# Patient Record
Sex: FEMALE | Race: WHITE | NOT HISPANIC OR LATINO | Employment: FULL TIME | URBAN - METROPOLITAN AREA
[De-identification: names, ages, dates, MRNs, and addresses within clinical notes are randomized per-mention and may not be internally consistent; named-entity substitution may affect disease eponyms.]

---

## 2024-01-23 ENCOUNTER — LAB (OUTPATIENT)
Dept: LAB | Facility: HOSPITAL | Age: 44
End: 2024-01-23
Payer: MEDICAID

## 2024-01-23 ENCOUNTER — OFFICE VISIT (OUTPATIENT)
Dept: FAMILY MEDICINE CLINIC | Facility: CLINIC | Age: 44
End: 2024-01-23
Payer: MEDICAID

## 2024-01-23 VITALS
OXYGEN SATURATION: 98 % | HEART RATE: 79 BPM | WEIGHT: 170 LBS | SYSTOLIC BLOOD PRESSURE: 126 MMHG | DIASTOLIC BLOOD PRESSURE: 80 MMHG | BODY MASS INDEX: 31.28 KG/M2 | HEIGHT: 62 IN

## 2024-01-23 DIAGNOSIS — F41.9 ANXIETY: ICD-10-CM

## 2024-01-23 DIAGNOSIS — F33.0 MAJOR DEPRESSIVE DISORDER, RECURRENT EPISODE, MILD DEGREE: ICD-10-CM

## 2024-01-23 DIAGNOSIS — Z76.89 ENCOUNTER TO ESTABLISH CARE: Primary | ICD-10-CM

## 2024-01-23 DIAGNOSIS — Z13.220 SCREENING FOR LIPID DISORDERS: ICD-10-CM

## 2024-01-23 LAB
ALBUMIN SERPL-MCNC: 4 G/DL (ref 3.5–5.2)
ALBUMIN/GLOB SERPL: 1.3 G/DL
ALP SERPL-CCNC: 64 U/L (ref 39–117)
ALT SERPL W P-5'-P-CCNC: 11 U/L (ref 1–33)
ANION GAP SERPL CALCULATED.3IONS-SCNC: 13.4 MMOL/L (ref 5–15)
AST SERPL-CCNC: 10 U/L (ref 1–32)
BILIRUB SERPL-MCNC: 0.2 MG/DL (ref 0–1.2)
BUN SERPL-MCNC: 10 MG/DL (ref 6–20)
BUN/CREAT SERPL: 10.3 (ref 7–25)
CALCIUM SPEC-SCNC: 9.6 MG/DL (ref 8.6–10.5)
CHLORIDE SERPL-SCNC: 101 MMOL/L (ref 98–107)
CHOLEST SERPL-MCNC: 249 MG/DL (ref 0–200)
CO2 SERPL-SCNC: 25.6 MMOL/L (ref 22–29)
CREAT SERPL-MCNC: 0.97 MG/DL (ref 0.57–1)
EGFRCR SERPLBLD CKD-EPI 2021: 74.5 ML/MIN/1.73
GLOBULIN UR ELPH-MCNC: 3.1 GM/DL
GLUCOSE SERPL-MCNC: 98 MG/DL (ref 65–99)
HBA1C MFR BLD: 5.8 % (ref 4.8–5.6)
HDLC SERPL QL: 3.36
HDLC SERPL-MCNC: 74 MG/DL (ref 40–60)
LDLC SERPL CALC-MCNC: 153 MG/DL (ref 0–100)
POTASSIUM SERPL-SCNC: 4.4 MMOL/L (ref 3.5–5.2)
PROT SERPL-MCNC: 7.1 G/DL (ref 6–8.5)
SODIUM SERPL-SCNC: 140 MMOL/L (ref 136–145)
TRIGL SERPL-MCNC: 125 MG/DL (ref 0–150)
TSH SERPL DL<=0.05 MIU/L-ACNC: 0.46 UIU/ML (ref 0.27–4.2)
VLDLC SERPL-MCNC: 22 MG/DL (ref 5–40)

## 2024-01-23 PROCEDURE — 83036 HEMOGLOBIN GLYCOSYLATED A1C: CPT | Performed by: STUDENT IN AN ORGANIZED HEALTH CARE EDUCATION/TRAINING PROGRAM

## 2024-01-23 PROCEDURE — 80053 COMPREHEN METABOLIC PANEL: CPT | Performed by: STUDENT IN AN ORGANIZED HEALTH CARE EDUCATION/TRAINING PROGRAM

## 2024-01-23 PROCEDURE — 80061 LIPID PANEL: CPT | Performed by: STUDENT IN AN ORGANIZED HEALTH CARE EDUCATION/TRAINING PROGRAM

## 2024-01-23 PROCEDURE — 84443 ASSAY THYROID STIM HORMONE: CPT | Performed by: STUDENT IN AN ORGANIZED HEALTH CARE EDUCATION/TRAINING PROGRAM

## 2024-01-23 RX ORDER — NORGESTIMATE AND ETHINYL ESTRADIOL 7DAYSX3 28
1 KIT ORAL DAILY
COMMUNITY
Start: 2024-01-03

## 2024-01-23 RX ORDER — BUPROPION HYDROCHLORIDE 150 MG/1
150 TABLET ORAL DAILY
Qty: 30 TABLET | Refills: 1 | Status: SHIPPED | OUTPATIENT
Start: 2024-01-23

## 2024-01-23 NOTE — PROGRESS NOTES
"    New Patient Office Visit      Date: 2024   Patient Name: Manisha Lopez  : 1980   MRN: [id number]     Chief Complaint:    Chief Complaint   Patient presents with    Establish Care     Blood pressure       History of Present Illness: Manisha Lopez is a 43 y.o. female who is here today to establish care.      Subjective      HPI:  Pt recently moved here from [place] to live with her significant other. Has a 12 and 15 year old.   Hasn't been to a PCP in sometime.   Has struggled on/off with anxiety and depressive episodes since she was 16.   On OCPs for the last few years and feels like these have caused significant mood changes as well.   Her mother passed away in the last couple of years and this has been really hard on her. The anniversary of her death recently passed and she struggled. Finds herself short tempered at times. Her partner is a really good support system for her and she states \"he calls me out when I'm being unreasonable.\"  She got temporary medicaid and immediately made an appt with a therapist which is scheduled in two weeks.   Interested in medication to help w her anxiety/depressive symptoms. Has been on Zoloft, Prozac, a few others in this class. Unfortunately had decreased libido/anorgasmia with all of them. Would like to try different medication.   She did briefly try Wellbutrin years ago for smoking cessation but didn't notice that it was particularly helpful for mood. Would be willing to try again.     Also notes some unwanted hair growth around the chin area. Has had this for years. Periods are regular. Has appt w Ob/Gyn next month. Partner recently had vasectomy so she is hopeful she can come off contraception altogether once he goes in for his checkup.     Since moving to [place] in  she has had about a 50 lbs weight loss. Diet has changed- rarely eats out, no fast food. She is more active as well but never thought she'd lose this much. Feels healthy. Wanted to make sure " "nothing else was going on to cause weight loss.     Review of Systems:   Negative/not pertinent unless otherwise noted above in HPI.     Past Medical History: No past medical history on file.    Past Surgical History: No past surgical history on file.    Family History: No family history on file.    Social History:   Social History     Socioeconomic History    Marital status: Single   Tobacco Use    Smoking status: Former     Types: Cigarettes    Smokeless tobacco: Former   Substance and Sexual Activity    Alcohol use: Not Currently    Drug use: Never    Sexual activity: Defer       Medications:     Current Outpatient Medications:     Tri-Estarylla 0.18/0.215/0.25 MG-35 MCG per tablet, Take 1 tablet by mouth Daily., Disp: , Rfl:     buPROPion XL (Wellbutrin XL) 150 MG 24 hr tablet, Take 1 tablet by mouth Daily., Disp: 30 tablet, Rfl: 1    Allergies:   No Known Allergies    Immunizations:    There is no immunization history on file for this patient.      Tobacco Use: Medium Risk (1/23/2024)    Patient History     Smoking Tobacco Use: Former     Smokeless Tobacco Use: Former     Passive Exposure: Not on file       Social History     Substance and Sexual Activity   Alcohol Use Not Currently        Social History     Substance and Sexual Activity   Drug Use Never        Diet/Physical activity: Healthy. counseled on 01/24/24      Sexual Health: using contraception, not attempting pregnancy   Menopause: pre-menopausal  Menstrual Cycles: regular, last menstrual cycle: unknown    PHQ-2 Depression Screening  Little interest or pleasure in doing things? 0-->not at all   Feeling down, depressed, or hopeless? 0-->not at all   PHQ-2 Total Score 0     PHQ-9 Total Score: 0       Objective     Physical Exam:  Vital Signs:   Vitals:    01/23/24 0922   BP: 126/80   BP Location: Left arm   Patient Position: Sitting   Cuff Size: Adult   Pulse: 79   SpO2: 98%   Weight: 77.1 kg (170 lb)   Height: 157.5 cm (62\")     Body mass index is " 31.09 kg/m².    Physical Exam  Constitutional:       Appearance: She is normal weight. She is not ill-appearing.   HENT:      Head: Normocephalic and atraumatic.      Right Ear: Tympanic membrane normal.      Left Ear: Tympanic membrane normal.      Mouth/Throat:      Mouth: Mucous membranes are moist.      Pharynx: Oropharynx is clear. No oropharyngeal exudate or posterior oropharyngeal erythema.   Eyes:      Extraocular Movements: Extraocular movements intact.      Conjunctiva/sclera: Conjunctivae normal.   Cardiovascular:      Rate and Rhythm: Normal rate and regular rhythm.      Heart sounds: Normal heart sounds.   Pulmonary:      Breath sounds: Normal breath sounds. No wheezing or rhonchi.   Abdominal:      General: Abdomen is flat.      Palpations: Abdomen is soft.      Tenderness: There is no abdominal tenderness.   Musculoskeletal:         General: Normal range of motion.      Cervical back: Normal range of motion and neck supple.   Lymphadenopathy:      Cervical: No cervical adenopathy.   Neurological:      General: No focal deficit present.      Mental Status: She is alert.   Psychiatric:         Mood and Affect: Mood normal.         Thought Content: Thought content normal.         Assessment / Plan      Assessment/Plan:   Diagnoses and all orders for this visit:    1. Encounter to establish care (Primary)  -     Comprehensive Metabolic Panel; Future  -     Lipid Panel With / Chol / HDL Ratio; Future  -     TSH Rfx On Abnormal To Free T4; Future  -     Hemoglobin A1c; Future  -     Comprehensive Metabolic Panel  -     Lipid Panel With / Chol / HDL Ratio  -     TSH Rfx On Abnormal To Free T4  -     Hemoglobin A1c    2. Major depressive disorder, recurrent episode, mild degree  -     buPROPion XL (Wellbutrin XL) 150 MG 24 hr tablet; Take 1 tablet by mouth Daily.  Dispense: 30 tablet; Refill: 1    3. Anxiety    4. Screening for lipid disorders  -     Lipid Panel With / Chol / HDL Ratio; Future  -     Lipid  Panel With / Chol / HDL Ratio      Depression/Anxiety  -Symptoms uncontrolled  -Given previous side effects with SSRI therapy, will trial Wellbutrin instead. Reviewed possible side effects including over stimulation, worsening anxiety  -Agree w counseling  -Check Phq-9, JOSE-7 at FU    Plan for physical/HM to be addressed at FU    Healthcare Maintenance:  BMI is >= 30 and <35. (Class 1 Obesity). The following options were offered after discussion;: nutrition counseling/recommendations      Manisha Lopez voices understanding and acceptance of this advice and will call back with any further questions or concerns. AVS with preventive healthcare tips printed for patient.         Follow Up:   Return in about 6 weeks (around 3/5/2024) for Annual/depression FU.    Jael Kendall DO  [facility] [place] [address]

## 2024-02-14 ENCOUNTER — TELEPHONE (OUTPATIENT)
Dept: FAMILY MEDICINE CLINIC | Facility: CLINIC | Age: 44
End: 2024-02-14
Payer: MEDICAID

## 2024-02-14 DIAGNOSIS — F41.9 ANXIETY: Primary | ICD-10-CM

## 2024-02-14 RX ORDER — BUSPIRONE HYDROCHLORIDE 10 MG/1
10 TABLET ORAL 2 TIMES DAILY
Qty: 60 TABLET | Refills: 0 | Status: SHIPPED | OUTPATIENT
Start: 2024-02-14

## 2024-03-04 ENCOUNTER — TELEPHONE (OUTPATIENT)
Dept: FAMILY MEDICINE CLINIC | Facility: CLINIC | Age: 44
End: 2024-03-04
Payer: MEDICAID

## 2024-03-04 DIAGNOSIS — F33.0 MAJOR DEPRESSIVE DISORDER, RECURRENT EPISODE, MILD DEGREE: ICD-10-CM

## 2024-03-04 DIAGNOSIS — F41.9 ANXIETY: ICD-10-CM

## 2024-03-04 RX ORDER — BUPROPION HYDROCHLORIDE 150 MG/1
150 TABLET ORAL DAILY
Qty: 30 TABLET | Refills: 2 | Status: SHIPPED | OUTPATIENT
Start: 2024-03-04

## 2024-03-04 RX ORDER — BUSPIRONE HYDROCHLORIDE 10 MG/1
10 TABLET ORAL 2 TIMES DAILY
Qty: 60 TABLET | Refills: 2 | Status: SHIPPED | OUTPATIENT
Start: 2024-03-04

## 2024-03-04 NOTE — TELEPHONE ENCOUNTER
Caller: Manisha Lopez    Relationship: Self    Best call back number: [phone]     What is the best time to reach you: ANY    Who are you requesting to speak with (clinical staff, provider,  specific staff member): JESSIE LO    Do you know the name of the person who called: SELF    What was the call regarding: PT WOULD LIKE RACHELLE TO CALL HER BACK.    Is it okay if the provider responds through MyChart: YES

## 2024-03-04 NOTE — TELEPHONE ENCOUNTER
Called pt. Lost insurance. Doing well on medications. Refills were sent until she can come back in for appt.

## 2024-03-25 DIAGNOSIS — F33.0 MAJOR DEPRESSIVE DISORDER, RECURRENT EPISODE, MILD DEGREE: ICD-10-CM

## 2024-03-25 RX ORDER — BUPROPION HYDROCHLORIDE 150 MG/1
150 TABLET ORAL DAILY
Qty: 90 TABLET | Refills: 0 | Status: SHIPPED | OUTPATIENT
Start: 2024-03-25

## 2024-04-18 ENCOUNTER — LAB (OUTPATIENT)
Dept: LAB | Facility: HOSPITAL | Age: 44
End: 2024-04-18
Payer: MEDICAID

## 2024-04-18 ENCOUNTER — OFFICE VISIT (OUTPATIENT)
Dept: FAMILY MEDICINE CLINIC | Facility: CLINIC | Age: 44
End: 2024-04-18
Payer: MEDICAID

## 2024-04-18 VITALS
OXYGEN SATURATION: 96 % | WEIGHT: 166.2 LBS | BODY MASS INDEX: 30.59 KG/M2 | HEIGHT: 62 IN | DIASTOLIC BLOOD PRESSURE: 74 MMHG | HEART RATE: 71 BPM | SYSTOLIC BLOOD PRESSURE: 132 MMHG

## 2024-04-18 DIAGNOSIS — Z30.011 ORAL CONTRACEPTION INITIAL PRESCRIPTION: ICD-10-CM

## 2024-04-18 DIAGNOSIS — N92.4 MENORRHAGIA, PREMENOPAUSAL: ICD-10-CM

## 2024-04-18 DIAGNOSIS — F41.9 ANXIETY: Primary | ICD-10-CM

## 2024-04-18 DIAGNOSIS — R45.86 MOOD SWINGS: ICD-10-CM

## 2024-04-18 DIAGNOSIS — F33.0 MAJOR DEPRESSIVE DISORDER, RECURRENT EPISODE, MILD DEGREE: ICD-10-CM

## 2024-04-18 PROCEDURE — 83002 ASSAY OF GONADOTROPIN (LH): CPT

## 2024-04-18 PROCEDURE — 36415 COLL VENOUS BLD VENIPUNCTURE: CPT

## 2024-04-18 PROCEDURE — 82670 ASSAY OF TOTAL ESTRADIOL: CPT

## 2024-04-18 PROCEDURE — 83001 ASSAY OF GONADOTROPIN (FSH): CPT

## 2024-04-18 PROCEDURE — 99214 OFFICE O/P EST MOD 30 MIN: CPT | Performed by: STUDENT IN AN ORGANIZED HEALTH CARE EDUCATION/TRAINING PROGRAM

## 2024-04-18 RX ORDER — NORGESTIMATE AND ETHINYL ESTRADIOL 0.25-0.035
1 KIT ORAL EVERY MORNING
COMMUNITY
Start: 2024-04-03 | End: 2024-04-18

## 2024-04-18 RX ORDER — NORETHINDRONE ACETATE AND ETHINYL ESTRADIOL 1MG-20(21)
1 KIT ORAL DAILY
Qty: 28 TABLET | Refills: 12 | Status: SHIPPED | OUTPATIENT
Start: 2024-04-18 | End: 2025-04-18

## 2024-04-18 NOTE — PROGRESS NOTES
Chief Complaint   Patient presents with    Major depressive disorder, recurrent episode, mild degree       HPI:  Manisha Lopez is a 43 y.o. female who presents today for FU anxiety/depression.     At last visit pt reported uncontrolled anxiety/depression symptoms. She was started on Wellbutrin and Buspar and reports this has worked really well for her. She does still have occasional days where she has mood swings, emotions feel up/down. She has tried to identify possible triggers and thinks it may be hormone related. She is on triphasic OCPs and feels like Tuesdays are typically worse. Will cry for no reason. Has this around time of her cycles too. She has never been on anything but triphasic OCPs. Periods are regular. Occasionally light and other times heavy bleeding.     PE:  Vitals:    04/18/24 1345   BP: 132/74   Pulse: 71   SpO2: 96%      Body mass index is 30.4 kg/m².    Gen Appearance: NAD  HEENT: Normocephalic, EOMI  Heart: RRR, normal S1 and S2, no murmur  Lungs: CTA b/l, no wheezing, no crackles  MSK: Moves all extremities well, normal gait, no peripheral edema  Neuro: No focal deficits    Current Outpatient Medications   Medication Sig Dispense Refill    buPROPion XL (Wellbutrin XL) 150 MG 24 hr tablet Take 1 tablet by mouth Daily. 90 tablet 0    busPIRone (BUSPAR) 10 MG tablet Take 1 tablet by mouth 2 (Two) Times a Day. 60 tablet 2    norethindrone-ethinyl estradiol FE (JUNEL FE 1/20) 1-20 MG-MCG per tablet Take 1 tablet by mouth Daily. 28 tablet 12     No current facility-administered medications for this visit.        A/P:  Diagnoses and all orders for this visit:    1. Anxiety (Primary)  2. Major depressive disorder, recurrent episode, mild degree  Moods significantly improved on current regimen of Buspar and Wellbutrin. Will continue. Planning to est with counselor soon.     3. Mood swings  4. Menorrhagia, premenopausal  Will check hormone labs per request. Given timing of her symptoms we discussed  option of switching from Triphasic to Monophasic OCPs and pt would like to try this. Rx sent.   -     Follicle stimulating hormone; Future  -     Luteinizing hormone; Future  -     Estradiol; Future    5. Oral contraception initial prescription  -     norethindrone-ethinyl estradiol FE (JUNEL FE 1/20) 1-20 MG-MCG per tablet; Take 1 tablet by mouth Daily.  Dispense: 28 tablet; Refill: 12         Return in about 6 weeks (around 5/30/2024) for Annual/pap 30 min.     Dictated Utilizing Dragon Dictation    Please note that portions of this note were completed with a voice recognition program.    Part of this note may be an electronic transcription/translation of spoken language to printed text using the Dragon Dictation System.

## 2024-04-19 PROBLEM — F41.9 ANXIETY: Status: ACTIVE | Noted: 2024-04-19

## 2024-04-19 PROBLEM — F33.0 MAJOR DEPRESSIVE DISORDER, RECURRENT EPISODE, MILD DEGREE: Status: ACTIVE | Noted: 2024-04-19

## 2024-04-19 LAB
ESTRADIOL SERPL HS-MCNC: 21.3 PG/ML
FSH SERPL-ACNC: 2.47 MIU/ML
LH SERPL-ACNC: 2.84 MIU/ML

## 2024-05-15 DIAGNOSIS — F41.9 ANXIETY: ICD-10-CM

## 2024-05-15 RX ORDER — BUSPIRONE HYDROCHLORIDE 10 MG/1
10 TABLET ORAL 2 TIMES DAILY
Qty: 60 TABLET | Refills: 2 | Status: SHIPPED | OUTPATIENT
Start: 2024-05-15

## 2024-05-15 NOTE — TELEPHONE ENCOUNTER
Rx Refill Note  Requested Prescriptions     Pending Prescriptions Disp Refills    busPIRone (BUSPAR) 10 MG tablet 60 tablet 2     Sig: Take 1 tablet by mouth 2 (Two) Times a Day.      Last office visit with prescribing clinician: 4/18/2024   Last telemedicine visit with prescribing clinician: Visit date not found   Next office visit with prescribing clinician: 6/3/2024       Catie Johnston MA  05/15/24, 14:29 EDT

## 2024-06-03 ENCOUNTER — OFFICE VISIT (OUTPATIENT)
Dept: FAMILY MEDICINE CLINIC | Facility: CLINIC | Age: 44
End: 2024-06-03
Payer: MEDICAID

## 2024-06-03 VITALS
OXYGEN SATURATION: 99 % | DIASTOLIC BLOOD PRESSURE: 84 MMHG | WEIGHT: 153 LBS | BODY MASS INDEX: 28.16 KG/M2 | SYSTOLIC BLOOD PRESSURE: 138 MMHG | HEART RATE: 80 BPM | HEIGHT: 62 IN

## 2024-06-03 DIAGNOSIS — F33.0 MAJOR DEPRESSIVE DISORDER, RECURRENT EPISODE, MILD DEGREE: ICD-10-CM

## 2024-06-03 DIAGNOSIS — N89.8 VAGINAL IRRITATION: ICD-10-CM

## 2024-06-03 DIAGNOSIS — Z23 NEED FOR PROPHYLACTIC VACCINATION AGAINST DIPHTHERIA AND TETANUS: ICD-10-CM

## 2024-06-03 DIAGNOSIS — A59.01 TRICHOMONAS VAGINITIS: ICD-10-CM

## 2024-06-03 DIAGNOSIS — Z00.00 ANNUAL PHYSICAL EXAM: Primary | ICD-10-CM

## 2024-06-03 DIAGNOSIS — Z12.31 ENCOUNTER FOR SCREENING MAMMOGRAM FOR MALIGNANT NEOPLASM OF BREAST: ICD-10-CM

## 2024-06-03 DIAGNOSIS — Z12.4 SCREENING FOR MALIGNANT NEOPLASM OF CERVIX: ICD-10-CM

## 2024-06-03 PROCEDURE — 1159F MED LIST DOCD IN RCRD: CPT | Performed by: STUDENT IN AN ORGANIZED HEALTH CARE EDUCATION/TRAINING PROGRAM

## 2024-06-03 PROCEDURE — 87798 DETECT AGENT NOS DNA AMP: CPT | Performed by: STUDENT IN AN ORGANIZED HEALTH CARE EDUCATION/TRAINING PROGRAM

## 2024-06-03 PROCEDURE — 87491 CHLMYD TRACH DNA AMP PROBE: CPT | Performed by: STUDENT IN AN ORGANIZED HEALTH CARE EDUCATION/TRAINING PROGRAM

## 2024-06-03 PROCEDURE — 87661 TRICHOMONAS VAGINALIS AMPLIF: CPT | Performed by: STUDENT IN AN ORGANIZED HEALTH CARE EDUCATION/TRAINING PROGRAM

## 2024-06-03 PROCEDURE — 87591 N.GONORRHOEAE DNA AMP PROB: CPT | Performed by: STUDENT IN AN ORGANIZED HEALTH CARE EDUCATION/TRAINING PROGRAM

## 2024-06-03 PROCEDURE — 90715 TDAP VACCINE 7 YRS/> IM: CPT | Performed by: STUDENT IN AN ORGANIZED HEALTH CARE EDUCATION/TRAINING PROGRAM

## 2024-06-03 PROCEDURE — 87801 DETECT AGNT MULT DNA AMPLI: CPT | Performed by: STUDENT IN AN ORGANIZED HEALTH CARE EDUCATION/TRAINING PROGRAM

## 2024-06-03 PROCEDURE — 1160F RVW MEDS BY RX/DR IN RCRD: CPT | Performed by: STUDENT IN AN ORGANIZED HEALTH CARE EDUCATION/TRAINING PROGRAM

## 2024-06-03 PROCEDURE — 90471 IMMUNIZATION ADMIN: CPT | Performed by: STUDENT IN AN ORGANIZED HEALTH CARE EDUCATION/TRAINING PROGRAM

## 2024-06-03 PROCEDURE — 99396 PREV VISIT EST AGE 40-64: CPT | Performed by: STUDENT IN AN ORGANIZED HEALTH CARE EDUCATION/TRAINING PROGRAM

## 2024-06-03 RX ORDER — BUPROPION HYDROCHLORIDE 300 MG/1
300 TABLET ORAL DAILY
Qty: 30 TABLET | Refills: 5 | Status: SHIPPED | OUTPATIENT
Start: 2024-06-03 | End: 2024-06-03 | Stop reason: SDUPTHER

## 2024-06-03 RX ORDER — BUPROPION HYDROCHLORIDE 300 MG/1
300 TABLET ORAL DAILY
Qty: 30 TABLET | Refills: 5 | Status: SHIPPED | OUTPATIENT
Start: 2024-06-03

## 2024-06-03 NOTE — PROGRESS NOTES
"    Female Physical Note      Date: 2024   Patient Name: Manisha Lopez  : 1980   MRN: [id number]     Chief Complaint:    Chief Complaint   Patient presents with    Annual Exam    Gynecologic Exam       History of Present Illness: Manisha Lopez is a 43 y.o. female who is here today for their annual health maintenance and physical.    HPI  Still having uncontrolled anxiety symptoms around the time of her menstrual cycle each month. She did not notice much difference in switching from OCP types. States last week was \"horrible.\" She would like to trial period off of OCPs but has been nervous to do so. Her significant other is s/p vasectomy.   FDLMP was 24. Gets some vaginal irritation just before her cycle starts and abnormal discharge. Agreeable to STI testing.   On certain days she will take two tablets of her Bupropion (total of 300mg). This does help with her mood. Would like to trial increase.  Due for mammogram.  Weight down again today to 153 lbs (was 166 in April). Continues to eat lower calorie intake and no longer drinking alcohol.       Subjective      Review of Systems:   Review of Systems   Constitutional:  Negative for activity change, appetite change, fever and unexpected weight loss.   Genitourinary:  Positive for vaginal discharge.   Psychiatric/Behavioral:  Positive for depressed mood. The patient is nervous/anxious.        Past Medical History, Social History, Family History and Care Team were all reviewed with patient and updated as appropriate.     Medications:     Current Outpatient Medications:     buPROPion XL (Wellbutrin XL) 300 MG 24 hr tablet, Take 1 tablet by mouth Daily., Disp: 30 tablet, Rfl: 5    busPIRone (BUSPAR) 10 MG tablet, Take 1 tablet by mouth 2 (Two) Times a Day., Disp: 60 tablet, Rfl: 2    norethindrone-ethinyl estradiol FE (JUNEL FE 1/20) 1-20 MG-MCG per tablet, Take 1 tablet by mouth Daily., Disp: 28 tablet, Rfl: 12    Allergies:   No Known " "Allergies    Immunizations:  Td/Tdap(Booster Q 10 yrs):  Today  Immunization History   Administered Date(s) Administered    COVID-19 (PFIZER) Purple Cap Monovalent 08/31/2021, 09/28/2021    Tdap 06/03/2024       Pap:  Today   Last Completed Pap Smear       This patient has no relevant Health Maintenance data.           Mammogram:  Ordered   Last Completed Mammogram       This patient has no relevant Health Maintenance data.             A1c: Reviewed  Lipid panel: Reviewed  The 10-year ASCVD risk score (Kylee RUIZ, et al., 2019) is: 0.7%    Values used to calculate the score:      Age: 43 years      Sex: Female      Is Non- : No      Diabetic: No      Tobacco smoker: No      Systolic Blood Pressure: 138 mmHg      Is BP treated: No      HDL Cholesterol: 74 mg/dL      Total Cholesterol: 249 mg/dL    Tobacco Use: Medium Risk (6/3/2024)    Patient History     Smoking Tobacco Use: Former     Smokeless Tobacco Use: Former     Passive Exposure: Not on file       Social History     Substance and Sexual Activity   Alcohol Use Not Currently        Social History     Substance and Sexual Activity   Drug Use Never        Diet/Physical activity: Healthy    Sexual Health: See HPI    PHQ-2 Depression Screening  Little interest or pleasure in doing things?     Feeling down, depressed, or hopeless?     PHQ-2 Total Score       PHQ-9 Total Score:   Reviewed in HPI      Objective     Physical Exam:  Vital Signs:   Vitals:    06/03/24 0915   BP: 138/84   BP Location: Left arm   Patient Position: Sitting   Cuff Size: Adult   Pulse: 80   SpO2: 99%   Weight: 69.4 kg (153 lb)   Height: 157.5 cm (62\")     Body mass index is 27.98 kg/m².     Physical Exam  Constitutional:       Appearance: She is normal weight. She is not ill-appearing.   HENT:      Head: Normocephalic and atraumatic.      Right Ear: Tympanic membrane normal.      Left Ear: Tympanic membrane normal.      Mouth/Throat:      Mouth: Mucous membranes are " moist.      Pharynx: Oropharynx is clear. No oropharyngeal exudate or posterior oropharyngeal erythema.   Eyes:      Extraocular Movements: Extraocular movements intact.      Conjunctiva/sclera: Conjunctivae normal.   Cardiovascular:      Rate and Rhythm: Normal rate and regular rhythm.      Heart sounds: Normal heart sounds.   Pulmonary:      Breath sounds: Normal breath sounds. No wheezing or rhonchi.   Abdominal:      General: Abdomen is flat.      Palpations: Abdomen is soft.      Tenderness: There is no abdominal tenderness.   Genitourinary:     Comments: Labia minora and majora without lesions or rash  Urethra normal, patent, without lesions or discharge  Introitus without lesions or evidence of trauma  Vagina with scant white mucoid discharge, no lesions  Cervix without visible lesions, min friable with Pap collection  Uterus midline without mass or tenderness  Adnexa without mass or tenderness bilaterally  Perineum intact without lesions  Anus without hemorrhoids or lesions    Breasts are normal and equal appearing  No palpable masses or lymphadenopathy noted  Normal appearing nipple, no discharge  Musculoskeletal:         General: Normal range of motion.      Cervical back: Normal range of motion and neck supple.   Lymphadenopathy:      Cervical: No cervical adenopathy.   Neurological:      General: No focal deficit present.      Mental Status: She is alert.   Psychiatric:         Mood and Affect: Mood normal.         Thought Content: Thought content normal.             Assessment / Plan      Assessment/Plan:   Diagnoses and all orders for this visit:    1. Annual physical exam (Primary)  Age appropriate screenings and recommendations reviewed  Cont healthy diet, regular physical activity  Mammo ordered  Pap today  Tdap today  -     Tdap Vaccine Greater Than or Equal To 8yo IM    2. Major depressive disorder, recurrent episode, mild degree  Suboptimally controlled  Increase wellbutrin to 300mg daily  Pt  would like to trial DC of OCPs to see if this helps w her mood changes around her cycle- will send update via My chart next month  Could consider low dose SSRI to help w mood in future  -     Discontinue: buPROPion XL (Wellbutrin XL) 300 MG 24 hr tablet; Take 1 tablet by mouth Daily.  Dispense: 30 tablet; Refill: 5  -     buPROPion XL (Wellbutrin XL) 300 MG 24 hr tablet; Take 1 tablet by mouth Daily.  Dispense: 30 tablet; Refill: 5    3. Encounter for screening mammogram for malignant neoplasm of breast  -     Mammo Screening Digital Tomosynthesis Bilateral With CAD; Future    4. Screening for malignant neoplasm of cervix  -     LIQUID-BASED PAP SMEAR WITH HPV GENOTYPING REGARDLESS OF INTERPRETATION (SASHA,COR,MAD); Future  -     LIQUID-BASED PAP SMEAR WITH HPV GENOTYPING REGARDLESS OF INTERPRETATION (SASHA,COR,MAD)    5. Vaginal irritation  -     NuSwab VG+ - Swab, Vagina; Future  -     NuSwab VG+ - Swab, Vagina    6. Need for prophylactic vaccination against diphtheria and tetanus  -     Tdap Vaccine Greater Than or Equal To 6yo IM         Healthcare Maintenance:  Counseling provided based on age appropriate USPSTF guidelines. Preventive counseling and anticipatory guidance discussed on the following topics: nutrition, physical activity, healthy weight, sexual behavior and STDs, contraception, mental health, immunizations, screenings, and self-breast exam         Manisha Lopez voices understanding and acceptance of this advice and will call back with any further questions or concerns. AVS with preventive healthcare tips printed for patient.         Follow Up:   Return in about 6 months (around 12/3/2024) for Anxiety/depression.          Jael Kendall DO  [facility] [place] [address]

## 2024-06-03 NOTE — LETTER
[facility]  Vaccine Consent Form    Patient Name:  Manisha Lopez  Patient :  1980     Vaccine(s) Ordered    Tdap Vaccine Greater Than or Equal To 6yo IM        Screening Checklist  The following questions should be completed prior to vaccination. If you answer “yes” to any question, it does not necessarily mean you should not be vaccinated. It just means we may need to clarify or ask more questions. If a question is unclear, please ask your healthcare provider to explain it.    Yes No   Any fever or moderate to severe illness today (mild illness and/or antibiotic treatment are not contraindications)?     Do you have a history of a serious reaction to any previous vaccinations, such as anaphylaxis, encephalopathy within 7 days, Guillain-Nunda syndrome within 6 weeks, seizure?     Have you received any live vaccine(s) (e.g MMR, KHARI) or any other vaccines in the last month (to ensure duplicate doses aren't given)?     Do you have an anaphylactic allergy to latex (DTaP, DTaP-IPV, Hep A, Hep B, MenB, RV, Td, Tdap), baker’s yeast (Hep B, HPV), polysorbates (RSV, nirsevimab, PCV 20, Rotavirrus, Tdap, Shingrix), or gelatin (KHARI, MMR)?     Do you have an anaphylactic allergy to neomycin (Rabies, KHARI, MMR, IPV, Hep A), polymyxin B (IPV), or streptomycin (IPV)?      Any cancer, leukemia, AIDS, or other immune system disorder? (KHARI, MMR, RV)     Do you have a parent, brother, or sister with an immune system problem (if immune competence of vaccine recipient clinically verified, can proceed)? (MMR, KHARI)     Any recent steroid treatments for >2 weeks, chemotherapy, or radiation treatment? (KHARI, MMR)     Have you received antibody-containing blood transfusions or IVIG in the past 11 months (recommended interval is dependent on product)? (MMR, KHARI)     Have you taken antiviral drugs (acyclovir, famciclovir, valacyclovir for KHARI) in the last 24 or 48 hours, respectively?      Are you pregnant or planning to become pregnant  "within 1 month? (KHARI, MMR, HPV, IPV, MenB, Abrexvy; For Hep B- refer to Engerix-B; For RSV - Abrysvo is indicated for 32-36 weeks of pregnancy from September to January)     For infants, have you ever been told your child has had intussusception or a medical emergency involving obstruction of the intestine (Rotavirus)? If not for an infant, can skip this question.         *Ordering Physicians/APC should be consulted if \"yes\" is checked by the patient or guardian above.  I have received, read, and understand the Vaccine Information Statement (VIS) for each vaccine ordered.  I have considered my or my child's health status as well as the health status of my close contacts.  I have taken the opportunity to discuss my vaccine questions with my or my child's health care provider.   I have requested that the ordered vaccine(s) be given to me or my child.  I understand the benefits and risks of the vaccines.  I understand that I should remain in the clinic for 15 minutes after receiving the vaccine(s).  _________________________________________________________  Signature of Patient or Parent/Legal Guardian ____________________  Date     "

## 2024-06-03 NOTE — PATIENT INSTRUCTIONS
Please remember to send me an updated on My Chart with how you're doing next month since we increased your Wellbutrin!  Happy early birthday!

## 2024-06-06 LAB
A VAGINAE DNA VAG QL NAA+PROBE: ABNORMAL SCORE
BVAB2 DNA VAG QL NAA+PROBE: ABNORMAL SCORE
C ALBICANS DNA VAG QL NAA+PROBE: NEGATIVE
C GLABRATA DNA VAG QL NAA+PROBE: NEGATIVE
C TRACH DNA VAG QL NAA+PROBE: NEGATIVE
MEGA1 DNA VAG QL NAA+PROBE: ABNORMAL SCORE
N GONORRHOEA DNA VAG QL NAA+PROBE: NEGATIVE
T VAGINALIS DNA VAG QL NAA+PROBE: POSITIVE

## 2024-06-06 RX ORDER — METRONIDAZOLE 500 MG/1
500 TABLET ORAL 2 TIMES DAILY
Qty: 14 TABLET | Refills: 0 | Status: SHIPPED | OUTPATIENT
Start: 2024-06-06 | End: 2024-06-13

## 2024-06-10 LAB — REF LAB TEST METHOD: NORMAL

## 2024-07-24 ENCOUNTER — HOSPITAL ENCOUNTER (OUTPATIENT)
Dept: MAMMOGRAPHY | Facility: HOSPITAL | Age: 44
Discharge: HOME | End: 2024-07-24
Admitting: STUDENT IN AN ORGANIZED HEALTH CARE EDUCATION/TRAINING PROGRAM
Payer: MEDICAID

## 2024-07-24 DIAGNOSIS — Z12.31 ENCOUNTER FOR SCREENING MAMMOGRAM FOR MALIGNANT NEOPLASM OF BREAST: ICD-10-CM

## 2024-07-24 LAB
NCCN CRITERIA FLAG: NORMAL
TYRER CUZICK SCORE: 10

## 2024-07-24 PROCEDURE — 77067 SCR MAMMO BI INCL CAD: CPT

## 2024-07-24 PROCEDURE — 77063 BREAST TOMOSYNTHESIS BI: CPT

## 2024-08-20 DIAGNOSIS — F41.9 ANXIETY: ICD-10-CM

## 2024-08-22 RX ORDER — BUSPIRONE HYDROCHLORIDE 10 MG/1
10 TABLET ORAL 2 TIMES DAILY
Qty: 180 TABLET | Refills: 0 | Status: SHIPPED | OUTPATIENT
Start: 2024-08-22

## 2024-11-17 DIAGNOSIS — F41.9 ANXIETY: ICD-10-CM

## 2024-11-18 RX ORDER — BUSPIRONE HYDROCHLORIDE 10 MG/1
10 TABLET ORAL 2 TIMES DAILY
Qty: 180 TABLET | Refills: 0 | Status: SHIPPED | OUTPATIENT
Start: 2024-11-18

## 2024-12-03 ENCOUNTER — OFFICE VISIT (OUTPATIENT)
Dept: FAMILY MEDICINE CLINIC | Facility: CLINIC | Age: 44
End: 2024-12-03
Payer: MEDICAID

## 2024-12-03 ENCOUNTER — LAB (OUTPATIENT)
Dept: LAB | Facility: HOSPITAL | Age: 44
End: 2024-12-03
Payer: MEDICAID

## 2024-12-03 VITALS
SYSTOLIC BLOOD PRESSURE: 136 MMHG | HEIGHT: 62 IN | OXYGEN SATURATION: 100 % | HEART RATE: 84 BPM | DIASTOLIC BLOOD PRESSURE: 92 MMHG | WEIGHT: 137 LBS | BODY MASS INDEX: 25.21 KG/M2

## 2024-12-03 DIAGNOSIS — R73.03 PREDIABETES: Primary | ICD-10-CM

## 2024-12-03 DIAGNOSIS — F41.9 ANXIETY: ICD-10-CM

## 2024-12-03 DIAGNOSIS — R63.4 UNINTENDED WEIGHT LOSS: ICD-10-CM

## 2024-12-03 DIAGNOSIS — R03.0 ELEVATED BLOOD PRESSURE READING: ICD-10-CM

## 2024-12-03 DIAGNOSIS — F33.0 MAJOR DEPRESSIVE DISORDER, RECURRENT EPISODE, MILD DEGREE: ICD-10-CM

## 2024-12-03 LAB
ALBUMIN SERPL-MCNC: 4.4 G/DL (ref 3.5–5.2)
ALBUMIN/GLOB SERPL: 1.5 G/DL
ALP SERPL-CCNC: 80 U/L (ref 39–117)
ALT SERPL W P-5'-P-CCNC: 8 U/L (ref 1–33)
ANION GAP SERPL CALCULATED.3IONS-SCNC: 11.9 MMOL/L (ref 5–15)
AST SERPL-CCNC: 13 U/L (ref 1–32)
BASOPHILS # BLD AUTO: 0.06 10*3/MM3 (ref 0–0.2)
BASOPHILS NFR BLD AUTO: 0.6 % (ref 0–1.5)
BILIRUB SERPL-MCNC: 0.4 MG/DL (ref 0–1.2)
BUN SERPL-MCNC: 8 MG/DL (ref 6–20)
BUN/CREAT SERPL: 8.2 (ref 7–25)
CALCIUM SPEC-SCNC: 10.1 MG/DL (ref 8.6–10.5)
CHLORIDE SERPL-SCNC: 104 MMOL/L (ref 98–107)
CO2 SERPL-SCNC: 25.1 MMOL/L (ref 22–29)
CREAT SERPL-MCNC: 0.98 MG/DL (ref 0.57–1)
DEPRECATED RDW RBC AUTO: 39.2 FL (ref 37–54)
EGFRCR SERPLBLD CKD-EPI 2021: 73.1 ML/MIN/1.73
EOSINOPHIL # BLD AUTO: 0.18 10*3/MM3 (ref 0–0.4)
EOSINOPHIL NFR BLD AUTO: 1.8 % (ref 0.3–6.2)
ERYTHROCYTE [DISTWIDTH] IN BLOOD BY AUTOMATED COUNT: 11.9 % (ref 12.3–15.4)
EXPIRATION DATE: NORMAL
GLOBULIN UR ELPH-MCNC: 3 GM/DL
GLUCOSE SERPL-MCNC: 98 MG/DL (ref 65–99)
HBA1C MFR BLD: 5.4 % (ref 4.5–5.7)
HCT VFR BLD AUTO: 46.3 % (ref 34–46.6)
HGB BLD-MCNC: 15 G/DL (ref 12–15.9)
IMM GRANULOCYTES # BLD AUTO: 0.02 10*3/MM3 (ref 0–0.05)
IMM GRANULOCYTES NFR BLD AUTO: 0.2 % (ref 0–0.5)
LYMPHOCYTES # BLD AUTO: 2.24 10*3/MM3 (ref 0.7–3.1)
LYMPHOCYTES NFR BLD AUTO: 22.2 % (ref 19.6–45.3)
Lab: NORMAL
MCH RBC QN AUTO: 29.5 PG (ref 26.6–33)
MCHC RBC AUTO-ENTMCNC: 32.4 G/DL (ref 31.5–35.7)
MCV RBC AUTO: 91 FL (ref 79–97)
MONOCYTES # BLD AUTO: 0.42 10*3/MM3 (ref 0.1–0.9)
MONOCYTES NFR BLD AUTO: 4.2 % (ref 5–12)
NEUTROPHILS NFR BLD AUTO: 7.19 10*3/MM3 (ref 1.7–7)
NEUTROPHILS NFR BLD AUTO: 71 % (ref 42.7–76)
NRBC BLD AUTO-RTO: 0 /100 WBC (ref 0–0.2)
PLATELET # BLD AUTO: 444 10*3/MM3 (ref 140–450)
PMV BLD AUTO: 9.4 FL (ref 6–12)
POTASSIUM SERPL-SCNC: 4 MMOL/L (ref 3.5–5.2)
PROT SERPL-MCNC: 7.4 G/DL (ref 6–8.5)
RBC # BLD AUTO: 5.09 10*6/MM3 (ref 3.77–5.28)
SODIUM SERPL-SCNC: 141 MMOL/L (ref 136–145)
TSH SERPL DL<=0.05 MIU/L-ACNC: 0.36 UIU/ML (ref 0.27–4.2)
WBC NRBC COR # BLD AUTO: 10.11 10*3/MM3 (ref 3.4–10.8)

## 2024-12-03 PROCEDURE — 80053 COMPREHEN METABOLIC PANEL: CPT

## 2024-12-03 PROCEDURE — 36415 COLL VENOUS BLD VENIPUNCTURE: CPT

## 2024-12-03 PROCEDURE — 99214 OFFICE O/P EST MOD 30 MIN: CPT | Performed by: STUDENT IN AN ORGANIZED HEALTH CARE EDUCATION/TRAINING PROGRAM

## 2024-12-03 PROCEDURE — 84443 ASSAY THYROID STIM HORMONE: CPT

## 2024-12-03 PROCEDURE — 83036 HEMOGLOBIN GLYCOSYLATED A1C: CPT | Performed by: STUDENT IN AN ORGANIZED HEALTH CARE EDUCATION/TRAINING PROGRAM

## 2024-12-03 PROCEDURE — 85025 COMPLETE CBC W/AUTO DIFF WBC: CPT

## 2024-12-03 PROCEDURE — 3044F HG A1C LEVEL LT 7.0%: CPT | Performed by: STUDENT IN AN ORGANIZED HEALTH CARE EDUCATION/TRAINING PROGRAM

## 2024-12-03 RX ORDER — BUPROPION HYDROCHLORIDE 150 MG/1
150 TABLET, FILM COATED, EXTENDED RELEASE ORAL 2 TIMES DAILY
Qty: 30 EACH | Refills: 0 | Status: SHIPPED | OUTPATIENT
Start: 2024-12-03

## 2024-12-03 RX ORDER — ESCITALOPRAM OXALATE 10 MG/1
TABLET ORAL
Qty: 30 TABLET | Refills: 1 | Status: SHIPPED | OUTPATIENT
Start: 2024-12-03

## 2024-12-03 NOTE — PATIENT INSTRUCTIONS
WELLBUTRIN TAPER:  -Start at 150mg twice daily for TWO WEEKS  -Then decrease to 1/2 tablet (75mg) twice daily for TWO WEEKS   -Then STOP    Lexapro TAPER  -Take 1/2 tablet (5mg) daily for ONE WEEK  -Then increase to full tablet (10mg) daily

## 2024-12-03 NOTE — PROGRESS NOTES
Chief Complaint   Patient presents with    Anxiety    Depression       HPI:  Manisha Lopez is a 44 y.o. female who presents today for anxiety/depression.     Mood symptoms have been worsening over the last few months.  Dealing with some family stressors.  She also had to have all of her teeth extracted last month and has not been able to eat because of the pain.  Has lost around 16 pounds since her last visit 6 months ago.  She is very self-conscious about her appearance with weight loss.  Med regimen at last appt was Wellbutrin (increased to 300mg) and Buspar 10mg BID. Mood has been worse. She had wanted to trial DC of her OCPs to see if this helped with mood regulation- mood noticeably worse around time of her menses.  Has successfully weaned off of these.  Has been on Prozac and Zoloft in the past. Prozac caused itching.  Zoloft was ineffective.    PE:  Vitals:    12/03/24 0819   BP: 136/92   Pulse: 84   SpO2: 100%      Body mass index is 25.06 kg/m².    Gen Appearance: NAD  HEENT: Normocephalic, PERRL, no thyromegaly, trachea midline  Heart: RRR, normal S1 and S2, no murmur  Lungs: CTA b/l, no wheezing, no crackles  MSK: Moves all extremities well, normal gait, no peripheral edema  Psych: Intermittently tearful, coherent thought processes  Neuro: No focal deficits    Current Outpatient Medications   Medication Sig Dispense Refill    busPIRone (BUSPAR) 10 MG tablet TAKE 1 TABLET BY MOUTH TWICE A  tablet 0    buPROPion (ZYBAN) 150 MG 12 hr tablet Take 150 mg by mouth 2 (Two) Times a Day. 30 each 0    escitalopram (Lexapro) 10 MG tablet Take 0.5 tablet daily for one week, then one tablet daily 30 tablet 1     No current facility-administered medications for this visit.        A/P:  Diagnoses and all orders for this visit:    1. Prediabetes (Primary)  A1c is now normal range  -     POC Glycosylated Hemoglobin (Hb A1C)    2. Anxiety  3. Major depressive disorder, recurrent episode, mild degree  Mood symptoms  are suboptimally controlled on current regimen.  Likely due to personal life stressors patient is very concerned about her appearance now w recent weight loss.  I am concerned that the increased bupropion dose is further contributing to lowered appetite.  Will reduce dose with plan of tapering off completely.  Discussed starting trial of SSRI therapy.  She has previously tried and failed Prozac and Zoloft.  Will start Lexapro.  Referral for counseling was placed.  May continue BuSpar in the meantime.  -     buPROPion (ZYBAN) 150 MG 12 hr tablet; Take 150 mg by mouth 2 (Two) Times a Day.  Dispense: 30 each; Refill: 0  -     escitalopram (Lexapro) 10 MG tablet; Take 0.5 tablet daily for one week, then one tablet daily  Dispense: 30 tablet; Refill: 1  -     Ambulatory Referral to Behavioral Health    4. Unintended weight loss  I suspect this is due to her recent full mouth extraction and inability to eat solid foods.  She just started increasing intake as of last week.  Will check labs as below to rule out secondary causes.  -     TSH Rfx On Abnormal To Free T4; Future  -     Comprehensive metabolic panel; Future    5. Elevated blood pressure reading  BP typically well-controlled however diastolic reading elevated in office today.  Could be stress related or side effect of bupropion.  Will wean off of the bupropion and recheck this at her follow-up appointment.  I advised her to monitor her ambulatory readings at home and follow DASH diet.  -     TSH Rfx On Abnormal To Free T4; Future  -     Comprehensive metabolic panel; Future  -     CBC & Differential; Future         Return in about 6 weeks (around 1/14/2025) for med/mood check.     Dictated Utilizing Dragon Dictation    Please note that portions of this note were completed with a voice recognition program.    Part of this note may be an electronic transcription/translation of spoken language to printed text using the Dragon Dictation System.

## 2024-12-15 ENCOUNTER — PATIENT MESSAGE (OUTPATIENT)
Dept: FAMILY MEDICINE CLINIC | Facility: CLINIC | Age: 44
End: 2024-12-15
Payer: MEDICAID

## 2024-12-15 DIAGNOSIS — F41.9 ANXIETY: Primary | ICD-10-CM

## 2024-12-15 DIAGNOSIS — F33.0 MAJOR DEPRESSIVE DISORDER, RECURRENT EPISODE, MILD DEGREE: ICD-10-CM

## 2024-12-16 RX ORDER — VENLAFAXINE HYDROCHLORIDE 37.5 MG/1
37.5 CAPSULE, EXTENDED RELEASE ORAL DAILY
Qty: 30 CAPSULE | Refills: 1 | Status: SHIPPED | OUTPATIENT
Start: 2024-12-16

## 2024-12-18 ENCOUNTER — OFFICE VISIT (OUTPATIENT)
Age: 44
End: 2024-12-18
Payer: MEDICAID

## 2024-12-18 DIAGNOSIS — F43.10 POST TRAUMATIC STRESS DISORDER (PTSD): Primary | ICD-10-CM
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
## 2024-12-18 NOTE — PROGRESS NOTES
Initial Assessment Note   Date: 12/18/2024   Client Name: Manisha Lopez  MRN: [id number]  Start Time: ***  End Time: ***     Diagnoses and all orders for this visit:    1. JOSE (generalized anxiety disorder) (Primary)    2. MDD (major depressive disorder), recurrent episode, mild         Active Symptoms:   ***     Reported History     Hx of Presenting problem:   Client reported seeking services today due to ***        Trauma Assessment:   Client reported a HX of trauma, which includes ***     Symptoms associated with experienced trauma:     {RW Trauma Symptoms:71268}    Summary:   Client reported the above symptoms ***     Clinician will utilize and trauma focused modality to aid the Client in their treatment.           Family HX of Mental Health Conditions:   Client reported ***     Previous Treatment HX/MH Hospitalizations:   Client reported ***        PHQ-9  The PHQ has not been completed during this encounter.        JOSE-7            Mental Status Exam  Appearance:  clean and casually dressed, appropriate  Attitude toward clinician:  cooperative and agreeable   Speech:    Rate:  regular rate and rhythm   Volume:  normal  Affect:  anxious  Thought Processes:  linear, logical, and goal directed  Thought Content:  normal  Suicidal Thoughts:  absent  Homicidal Thoughts:  absent  Perceptual Disturbance: no perceptual disturbance  Attention and Concentration:  good  Insight and Judgement:  good  Memory:  memory appears to be intact       Support System and Protective Factors     Client reported having the following support system:   ***     Client described their interactions with their support system as ***     Does Client have a responsibility to care for children or pets at this time?   ***     Level of Client's current coping skills:   Client has *** Coping Skills.    Does Client have plans for the future that extend beyond one week?   ***     Can Client provide a reason for living?   ***     Does Client  feel like their life has a purpose?   ***     Does Client feel safe in their living environment?     Client reported they feel ***        Short Term goal for treatment:   “***.”     Long Term goal for treatment:   “***.”     Services Client is Seeking:  { Services Seekin}     [place] Suicide Severity Rating (C-SSRS)  Assessment was completed and is in the chart    C.A.G.E.  C: Have you ever felt like he needed to cut down on your drinking or drug use?  {YES OR NO:46874}   A: Have people annoyed you by criticizing your drinking or drug use?  {YES OR NO:82020}   G: Have you ever felt bad or guilty about your drinking or drug use? {YES OR NO:72467}   E: Have you ever had a drink first thing in the morning to steady her nerves or to get rid of a hangover? {YES OR NO:00820}       Risk of Harm to Self:   { Low - High:00262}    Client reported ***     Does Client currently have or has ever had a HX of HI/Desire to inflict serious injury onto another/Physically Aggressive BX/Verbally aggressive BX?   ***     Risk of Harm to Others:   { Low - High:86719}    Client reported ***        Initial Session Narrative     Clinical Formulation  Client reported seeking services today due to ***    Client *** to having a HX of SI, HI, substance misuse, aggressive physical behaviors, or psychiatric hospitalizations.     Client reported they live ***     Client's current coping skills consist of ***    Per Client's reports, Client meets the criteria for ***    ?   Initial intervention:   Clinician met with Client in person at [facility].     Clinician completed initial assessment, [place] Suicide Related Assessment, safety plan, CAGE addiction assessment, PHQ-9, JOSE 7, trauma assessment, and explored the Client's protective factors and strengthens.     Clinician explained permission to treat, confidentiality, the limitations of confidentiality, and discussed NO SHOW policy.     Clinician utilized the MI  technique of active listening and open ended questions, to gather information, provide validation, assess barriers/readiness for change, and build rapport.     Clinician provided the Client with information on DX and possible treatment methods i.e., CBT/DBT/SFT/EMDR.    Clinician provided psychoeducation to the client on the use of the EMDR and the client agreed to the use of EMDR in session.    Client was receptive throughout the session and agreed to work with this Clinician to obtain their treatment goals.     Clinician plans to complete any outstanding assessments needed for treatment and complete the Client's Care/Treatment Plan with the client during the next session.      Employment: {Cancer Employment:54173}    Education: As the client currently enrolled or attending an education or vocation program?{Yes or No:64482}    Arrests in past 30 days? ***    Number of self-help groups attended in past 30 days?  ***    Ford Hendricks LCSW  [facility]   no

## 2024-12-18 NOTE — PATIENT INSTRUCTIONS
"Client will begin to use a DBT technique of AAA awareness piece, by practicing the use of identifying the 3 elements of the cognitive triangle for discussion in the next session.      Follow-up:     Client agrees to keep all follow-up appointments with [facility].       Resources:     Contact Office at [phone], [phone], Text \"HOME\" to [phone], and/or go to the nearest Hospital/ER.   "

## 2025-03-04 DIAGNOSIS — F41.9 ANXIETY: ICD-10-CM

## 2025-03-04 DIAGNOSIS — F33.0 MAJOR DEPRESSIVE DISORDER, RECURRENT EPISODE, MILD DEGREE: ICD-10-CM

## 2025-03-04 RX ORDER — VENLAFAXINE HYDROCHLORIDE 37.5 MG/1
37.5 CAPSULE, EXTENDED RELEASE ORAL DAILY
Qty: 30 CAPSULE | Refills: 1 | Status: SHIPPED | OUTPATIENT
Start: 2025-03-04

## 2025-03-04 NOTE — TELEPHONE ENCOUNTER
Rx Refill Note  Requested Prescriptions      No prescriptions requested or ordered in this encounter      Last office visit with prescribing clinician: 12/3/2024   Last telemedicine visit with prescribing clinician: Visit date not found   Next office visit with prescribing clinician: Visit date not found       Catie Johnston MA  03/04/25, 14:04 EST

## 2025-03-13 DIAGNOSIS — F33.0 MAJOR DEPRESSIVE DISORDER, RECURRENT EPISODE, MILD DEGREE: ICD-10-CM

## 2025-03-13 DIAGNOSIS — F41.9 ANXIETY: ICD-10-CM

## 2025-03-13 RX ORDER — VENLAFAXINE HYDROCHLORIDE 37.5 MG/1
37.5 CAPSULE, EXTENDED RELEASE ORAL DAILY
Qty: 30 CAPSULE | Refills: 2 | Status: SHIPPED | OUTPATIENT
Start: 2025-03-13

## 2025-04-11 DIAGNOSIS — F33.0 MAJOR DEPRESSIVE DISORDER, RECURRENT EPISODE, MILD DEGREE: ICD-10-CM

## 2025-04-11 RX ORDER — BUPROPION HYDROCHLORIDE 150 MG/1
150 TABLET, FILM COATED, EXTENDED RELEASE ORAL 2 TIMES DAILY
Qty: 30 TABLET | Refills: 1 | Status: SHIPPED | OUTPATIENT
Start: 2025-04-11

## 2025-04-21 ENCOUNTER — OFFICE VISIT (OUTPATIENT)
Dept: FAMILY MEDICINE CLINIC | Facility: CLINIC | Age: 45
End: 2025-04-21
Payer: MEDICAID

## 2025-04-21 ENCOUNTER — LAB (OUTPATIENT)
Dept: LAB | Facility: HOSPITAL | Age: 45
End: 2025-04-21
Payer: MEDICAID

## 2025-04-21 VITALS
OXYGEN SATURATION: 99 % | WEIGHT: 138 LBS | SYSTOLIC BLOOD PRESSURE: 128 MMHG | HEIGHT: 62 IN | HEART RATE: 70 BPM | DIASTOLIC BLOOD PRESSURE: 82 MMHG | BODY MASS INDEX: 25.4 KG/M2

## 2025-04-21 DIAGNOSIS — N94.6 PAINFUL MENSTRUATION: Primary | ICD-10-CM

## 2025-04-21 DIAGNOSIS — N92.0 MENORRHAGIA WITH REGULAR CYCLE: ICD-10-CM

## 2025-04-21 DIAGNOSIS — F33.0 MAJOR DEPRESSIVE DISORDER, RECURRENT EPISODE, MILD DEGREE: ICD-10-CM

## 2025-04-21 DIAGNOSIS — F41.9 ANXIETY: ICD-10-CM

## 2025-04-21 LAB
BASOPHILS # BLD AUTO: 0.07 10*3/MM3 (ref 0–0.2)
BASOPHILS NFR BLD AUTO: 0.9 % (ref 0–1.5)
DEPRECATED RDW RBC AUTO: 43.3 FL (ref 37–54)
EOSINOPHIL # BLD AUTO: 0.38 10*3/MM3 (ref 0–0.4)
EOSINOPHIL NFR BLD AUTO: 5 % (ref 0.3–6.2)
ERYTHROCYTE [DISTWIDTH] IN BLOOD BY AUTOMATED COUNT: 12.9 % (ref 12.3–15.4)
HCT VFR BLD AUTO: 44 % (ref 34–46.6)
HGB BLD-MCNC: 14.2 G/DL (ref 12–15.9)
IMM GRANULOCYTES # BLD AUTO: 0.01 10*3/MM3 (ref 0–0.05)
IMM GRANULOCYTES NFR BLD AUTO: 0.1 % (ref 0–0.5)
LYMPHOCYTES # BLD AUTO: 2.48 10*3/MM3 (ref 0.7–3.1)
LYMPHOCYTES NFR BLD AUTO: 32.9 % (ref 19.6–45.3)
MCH RBC QN AUTO: 29.6 PG (ref 26.6–33)
MCHC RBC AUTO-ENTMCNC: 32.3 G/DL (ref 31.5–35.7)
MCV RBC AUTO: 91.7 FL (ref 79–97)
MONOCYTES # BLD AUTO: 0.43 10*3/MM3 (ref 0.1–0.9)
MONOCYTES NFR BLD AUTO: 5.7 % (ref 5–12)
NEUTROPHILS NFR BLD AUTO: 4.16 10*3/MM3 (ref 1.7–7)
NEUTROPHILS NFR BLD AUTO: 55.4 % (ref 42.7–76)
NRBC BLD AUTO-RTO: 0 /100 WBC (ref 0–0.2)
PLATELET # BLD AUTO: 347 10*3/MM3 (ref 140–450)
PMV BLD AUTO: 9.9 FL (ref 6–12)
RBC # BLD AUTO: 4.8 10*6/MM3 (ref 3.77–5.28)
TSH SERPL DL<=0.05 MIU/L-ACNC: 0.56 UIU/ML (ref 0.27–4.2)
WBC NRBC COR # BLD AUTO: 7.53 10*3/MM3 (ref 3.4–10.8)

## 2025-04-21 PROCEDURE — 84443 ASSAY THYROID STIM HORMONE: CPT

## 2025-04-21 PROCEDURE — 99214 OFFICE O/P EST MOD 30 MIN: CPT | Performed by: STUDENT IN AN ORGANIZED HEALTH CARE EDUCATION/TRAINING PROGRAM

## 2025-04-21 PROCEDURE — 1126F AMNT PAIN NOTED NONE PRSNT: CPT | Performed by: STUDENT IN AN ORGANIZED HEALTH CARE EDUCATION/TRAINING PROGRAM

## 2025-04-21 PROCEDURE — 85025 COMPLETE CBC W/AUTO DIFF WBC: CPT

## 2025-04-21 RX ORDER — BUPROPION HYDROCHLORIDE 300 MG/1
300 TABLET ORAL DAILY
Qty: 90 TABLET | Refills: 3 | Status: SHIPPED | OUTPATIENT
Start: 2025-04-21

## 2025-04-21 RX ORDER — BUSPIRONE HYDROCHLORIDE 10 MG/1
10 TABLET ORAL 2 TIMES DAILY
Qty: 180 TABLET | Refills: 3 | Status: SHIPPED | OUTPATIENT
Start: 2025-04-21

## 2025-04-21 RX ORDER — BUPROPION HYDROCHLORIDE 300 MG/1
1 TABLET ORAL DAILY
COMMUNITY
Start: 2025-02-05 | End: 2025-04-21 | Stop reason: SDUPTHER

## 2025-04-21 RX ORDER — VENLAFAXINE HYDROCHLORIDE 37.5 MG/1
37.5 CAPSULE, EXTENDED RELEASE ORAL DAILY
Qty: 90 CAPSULE | Refills: 3 | Status: SHIPPED | OUTPATIENT
Start: 2025-04-21

## 2025-04-21 NOTE — PROGRESS NOTES
Chief Complaint   Patient presents with    Menopause     Pt. States she has very Long Periods lasting for about 2 weeks.Pt. States she has 2 periods a months with Pain.       HPI:  Manisha Lopez is a 44 y.o. female who presents today for AUB.    Over last few months pt has been experiencing prolonged, heavy menstrual bleeding. Cycle in February lasted 2 weeks. Bled for 4 days, then one day of nothing, the following day had even heavier flow which lasted ~10 days. Will sometimes have large clots (states larger than golf ball sized). Cycles are still occurring monthly. Had hormone levels drawn last year that were within pre-menopausal range. She was on OCPs a couple of years ago but elected to come off due to mood side effects. She now takes Estroven OTC. Does feel that this helps with mood swings.    She feels like mood is doing OK for now. Taking Buspar, Wellbutrin and Effexor. Previously would get upset/cry easily, no longer doing this. Does have some lower libido but feels its tolerable. Emotions can be numb at times but still able to feel these. She does not wish to make any changes today.    Weight is stable. Dentures now fit well and she is able tolerate solid foods.      PE:  Vitals:    04/21/25 0819   BP: 128/82   Pulse: 70   SpO2: 99%      Body mass index is 25.23 kg/m².    Gen Appearance: NAD  HEENT: Normocephalic, EOMI, no thyromegaly, trachea midline  Heart: RRR, normal S1 and S2, no murmur  Lungs: CTA b/l, no wheezing, no crackles  MSK: Moves all extremities well, normal gait, no peripheral edema  Neuro: No focal deficits    Current Outpatient Medications   Medication Sig Dispense Refill    buPROPion XL (WELLBUTRIN XL) 300 MG 24 hr tablet Take 1 tablet by mouth Daily. 90 tablet 3    busPIRone (BUSPAR) 10 MG tablet Take 1 tablet by mouth 2 (Two) Times a Day. 180 tablet 3    venlafaxine XR (Effexor XR) 37.5 MG 24 hr capsule Take 1 capsule by mouth Daily. 90 capsule 3     No current facility-administered  medications for this visit.        A/P:  Assessment & Plan  Painful menstruation  Menorrhagia with regular cycle  Increase in bleeding quantity, duration w still monthly cycles  Likely perimenopausal  Check labs as below to r/o secondary cause  Pap UTD 2024  Referral to OB/Gyn for further management    Orders:    Ambulatory Referral to Obstetrics / Gynecology    TSH; Future    CBC & Differential; Future    Anxiety  PT reports controlled  Cont current regimen  Orders:    venlafaxine XR (Effexor XR) 37.5 MG 24 hr capsule; Take 1 capsule by mouth Daily.    busPIRone (BUSPAR) 10 MG tablet; Take 1 tablet by mouth 2 (Two) Times a Day.    Major depressive disorder, recurrent episode, mild degree  Controlled, cont current regimen    Orders:    buPROPion XL (WELLBUTRIN XL) 300 MG 24 hr tablet; Take 1 tablet by mouth Daily.    venlafaxine XR (Effexor XR) 37.5 MG 24 hr capsule; Take 1 capsule by mouth Daily.        Return in about 6 months (around 10/21/2025) for Est w new PCP 30 min .     Dictated Utilizing Dragon Dictation    Please note that portions of this note were completed with a voice recognition program.    Part of this note may be an electronic transcription/translation of spoken language to printed text using the Dragon Dictation System.

## 2025-04-21 NOTE — ASSESSMENT & PLAN NOTE
PT reports controlled  Cont current regimen  Orders:    venlafaxine XR (Effexor XR) 37.5 MG 24 hr capsule; Take 1 capsule by mouth Daily.    busPIRone (BUSPAR) 10 MG tablet; Take 1 tablet by mouth 2 (Two) Times a Day.

## 2025-04-21 NOTE — ASSESSMENT & PLAN NOTE
Controlled, cont current regimen    Orders:    buPROPion XL (WELLBUTRIN XL) 300 MG 24 hr tablet; Take 1 tablet by mouth Daily.    venlafaxine XR (Effexor XR) 37.5 MG 24 hr capsule; Take 1 capsule by mouth Daily.

## 2025-07-18 ENCOUNTER — OFFICE VISIT (OUTPATIENT)
Dept: FAMILY MEDICINE CLINIC | Facility: CLINIC | Age: 45
End: 2025-07-18
Payer: MEDICAID

## 2025-07-18 VITALS
SYSTOLIC BLOOD PRESSURE: 134 MMHG | BODY MASS INDEX: 30.77 KG/M2 | OXYGEN SATURATION: 100 % | HEIGHT: 62 IN | WEIGHT: 167.2 LBS | HEART RATE: 84 BPM | DIASTOLIC BLOOD PRESSURE: 82 MMHG | TEMPERATURE: 97.6 F

## 2025-07-18 DIAGNOSIS — F41.9 ANXIETY: ICD-10-CM

## 2025-07-18 DIAGNOSIS — N95.1 PERIMENOPAUSAL SYMPTOMS: ICD-10-CM

## 2025-07-18 DIAGNOSIS — F33.0 MAJOR DEPRESSIVE DISORDER, RECURRENT EPISODE, MILD DEGREE: ICD-10-CM

## 2025-07-18 DIAGNOSIS — N92.0 MENORRHAGIA WITH REGULAR CYCLE: ICD-10-CM

## 2025-07-18 DIAGNOSIS — R06.83 SNORING: Primary | ICD-10-CM

## 2025-07-18 RX ORDER — DROSPIRENONE AND ETHINYL ESTRADIOL 0.02-3(28)
1 KIT ORAL DAILY
Qty: 28 TABLET | Refills: 12 | Status: SHIPPED | OUTPATIENT
Start: 2025-07-18

## 2025-07-18 NOTE — PROGRESS NOTES
Office Note     Name: Manisha Lopez    : 1980     MRN: [id number]     Chief Complaint  Establish Care    Subjective       History of Present Illness  The patient presents for evaluation of snoring, heavy menstrual bleeding, and anxiety.    She has been experiencing sleep disturbances, including snoring, which has worsened over the past few weeks. Her boyfriend has observed episodes of apnea during her sleep. She has not undergone a sleep study. She has tried using mouth tape to alleviate her symptoms, but it was ineffective due to nasal congestion. Her snoring is exacerbated when she sleeps on her back, but it persists even when she sleeps on her sides or stomach. She has a family history of snoring, with her sister currently using a CPAP machine.    She reports heavy menstrual bleeding, requiring her to change pads five times by noon on the first day of her period. The second day is even more severe, followed by a gradual decrease in flow on the third and fourth days. However, the day before her period ends, the flow intensifies again before abruptly stopping the next day. This pattern has been consistent since she stopped taking birth control in . She also complains of  symptoms such as night sweats and hot flashes, which she attributes to perimenopause. Her periods are regular, occurring at the start of each month, with no missed periods. Her last period started on 2025 and ended on 2025. She does not currently have a gynecologist. She was previously on Tri-Sprintec, which she found beneficial, but discontinued it in 2023. She also reports persistent facial hair growth. She recalls a previous doctor suggesting she might have PCOS, but no testing was done to confirm this.    She is currently on Wellbutrin 300 mg daily, buspirone twice daily, and venlafaxine 37.5 mg once daily. She reports feeling numb at times but does not wish to experience intense emotions. She had started therapy  but discontinued it due to personal circumstances. She now feels ready to resume therapy. She uses an joselin for journaling and occasionally experiences intrusive thoughts. She was previously seeing a therapist here and is interested in resuming sessions.    She has a history of prediabetes, hypertension, depression, and anxiety. She had a tooth extraction and skin biopsy but no other surgeries. She had her adenoids removed in .        Review of Systems:   Review of Systems   Constitutional:  Positive for fatigue. Negative for chills and fever.   HENT:  Negative for congestion, ear pain, rhinorrhea, sinus pain and sore throat.    Respiratory:  Negative for cough, shortness of breath and wheezing.    Cardiovascular:  Negative for chest pain.   Gastrointestinal:  Negative for abdominal pain.   Genitourinary:  Positive for menstrual problem.   Musculoskeletal:  Negative for myalgias.   Neurological:  Negative for dizziness and headaches.        Past Medical History:   Past Medical History:   Diagnosis Date    Allergic 3/1/25    Anxiety     Depression     Hypertension     Not sure the date    Prediabetes     PTSD (post-traumatic stress disorder) 2006       Past Surgical History:   Past Surgical History:   Procedure Laterality Date    ADENOIDECTOMY Bilateral     SKIN BIOPSY      No cancer found in mole removed    TOOTH EXTRACTION         Family History:   Family History   Problem Relation Age of Onset    Anxiety disorder Mother     Depression Mother     Hyperlipidemia Mother     Dementia Mother     Breast cancer Neg Hx     Endometrial cancer Neg Hx     Ovarian cancer Neg Hx        Social History:   Social History     Socioeconomic History    Marital status: Single   Tobacco Use    Smoking status: Former     Current packs/day: 0.00     Average packs/day: 1 pack/day for 28.5 years (28.5 ttl pk-yrs)     Types: Cigarettes     Start date:      Quit date: 2023     Years since quittin.0     Passive  "exposure: Never    Smokeless tobacco: Former   Vaping Use    Vaping status: Every Day    Substances: Nicotine    Devices: Disposable   Substance and Sexual Activity    Alcohol use: Never    Drug use: Never    Sexual activity: Yes     Partners: Male     Birth control/protection: Vasectomy       Immunizations:   Immunization History   Administered Date(s) Administered    COVID-19 (PFIZER) Purple Cap Monovalent 08/31/2021, 09/28/2021    Tdap 06/03/2024        Medications:     Current Outpatient Medications:     buPROPion XL (WELLBUTRIN XL) 300 MG 24 hr tablet, Take 1 tablet by mouth Daily., Disp: 90 tablet, Rfl: 3    busPIRone (BUSPAR) 10 MG tablet, Take 1 tablet by mouth 2 (Two) Times a Day., Disp: 180 tablet, Rfl: 3    venlafaxine XR (Effexor XR) 37.5 MG 24 hr capsule, Take 1 capsule by mouth Daily., Disp: 90 capsule, Rfl: 3    drospirenone-ethinyl estradiol (ANGELICA,GIANVI) 3-0.02 MG per tablet, Take 1 tablet by mouth Daily., Disp: 28 tablet, Rfl: 12    Allergies:   No Known Allergies    Objective     Vital Signs  /82 (BP Location: Right arm, Patient Position: Sitting, Cuff Size: Adult)   Pulse 84   Temp 97.6 °F (36.4 °C) (Temporal)   Ht 157.5 cm (62.01\")   Wt 75.8 kg (167 lb 3.2 oz)   SpO2 100%   BMI 30.57 kg/m²   Estimated body mass index is 30.57 kg/m² as calculated from the following:    Height as of this encounter: 157.5 cm (62.01\").    Weight as of this encounter: 75.8 kg (167 lb 3.2 oz).           Physical Exam  Vitals reviewed.   Constitutional:       General: She is not in acute distress.     Appearance: Normal appearance. She is not ill-appearing.   HENT:      Head: Normocephalic and atraumatic.      Nose: Nose normal. No congestion.      Mouth/Throat:      Mouth: Mucous membranes are moist.      Pharynx: Oropharynx is clear. No oropharyngeal exudate or posterior oropharyngeal erythema.   Eyes:      Pupils: Pupils are equal, round, and reactive to light.   Cardiovascular:      Rate and Rhythm: " Normal rate and regular rhythm.      Pulses: Normal pulses.      Heart sounds: Normal heart sounds. No murmur heard.  Pulmonary:      Effort: Pulmonary effort is normal. No respiratory distress.      Breath sounds: Normal breath sounds. No wheezing or rales.   Lymphadenopathy:      Cervical: No cervical adenopathy.   Neurological:      Mental Status: She is alert.          Procedures     Results:  No results found for this or any previous visit (from the past 24 hours).     Assessment and Plan     Assessment/Plan:  Assessment & Plan  Major depressive disorder, recurrent episode, mild degree           Anxiety         Snoring    Orders:    Ambulatory Referral to Sleep Medicine    Menorrhagia with regular cycle    Orders:    drospirenone-ethinyl estradiol (ANGELICA,GIANVI) 3-0.02 MG per tablet; Take 1 tablet by mouth Daily.    ferrous sulfate 325 (65 FE) MG tablet; Take one pill by mouth daily during monthly bleeding.    Ambulatory Referral to Obstetrics / Gynecology    Perimenopausal symptoms    Orders:    Ambulatory Referral to Obstetrics / Gynecology      Assessment & Plan  1. Sleep apnea.  - Long-standing snoring issue suggests a potential diagnosis of sleep apnea.  - Referral for a Sleep Medicine will be made to evaluate for sleep apnea.  - Advised to elevate the head of the bed by 15 to 30 degrees during sleep and to use nose strips to aid in opening the airway.  - Mouth taping is not recommended.    2. Heavy menstrual bleeding.  - Reports significant menstrual bleeding since discontinuing birth control in 2023; periods are still regular, indicating early perimenopause.  - Hormonal birth control suggested to manage cycle and bleeding, along with iron supplements during menstrual period.  - Risks associated with estrogen use discussed, particularly in light of vaping habit.  - Referral to gynecology for further evaluation and potential transvaginal ultrasound to rule out fibroids or ovarian cysts.    3. Anxiety.  -  Currently on Wellbutrin 300 mg daily, buspirone twice a day, and venlafaxine 37.5 mg once a day; reports feeling numb at times but does not want to feel overly reactive.  - Cognitive behavioral therapy discussed as a potential benefit.  - Advised to schedule an appointment with previous therapist to address anxiety and overthinking.  - Gradual tapering of medications will be considered once therapy is resumed.      Follow Up  Return in about 3 months (around 10/18/2025) for Annual physical.    Patient or patient representative verbalized consent for the use of Ambient Listening during the visit with  Jazzy Nuñez PA-C for chart documentation. 7/18/2025 2:15pm.         Jazzy Nuñez PA-C   [facility]

## 2025-07-19 RX ORDER — FERROUS SULFATE 325(65) MG
TABLET ORAL
Qty: 30 TABLET | Refills: 2 | Status: SHIPPED | OUTPATIENT
Start: 2025-07-19